# Patient Record
Sex: FEMALE
[De-identification: names, ages, dates, MRNs, and addresses within clinical notes are randomized per-mention and may not be internally consistent; named-entity substitution may affect disease eponyms.]

---

## 2022-08-03 PROBLEM — Z00.00 ENCOUNTER FOR PREVENTIVE HEALTH EXAMINATION: Status: ACTIVE | Noted: 2022-08-03

## 2022-08-30 ENCOUNTER — APPOINTMENT (OUTPATIENT)
Dept: HEPATOLOGY | Facility: CLINIC | Age: 62
End: 2022-08-30

## 2022-09-06 ENCOUNTER — TRANSCRIPTION ENCOUNTER (OUTPATIENT)
Age: 62
End: 2022-09-06

## 2022-11-03 ENCOUNTER — NON-APPOINTMENT (OUTPATIENT)
Age: 62
End: 2022-11-03

## 2022-11-03 ENCOUNTER — APPOINTMENT (OUTPATIENT)
Dept: ALLERGY | Facility: CLINIC | Age: 62
End: 2022-11-03

## 2022-11-03 VITALS
OXYGEN SATURATION: 97 % | WEIGHT: 120 LBS | SYSTOLIC BLOOD PRESSURE: 104 MMHG | TEMPERATURE: 98.3 F | DIASTOLIC BLOOD PRESSURE: 70 MMHG | BODY MASS INDEX: 22.37 KG/M2 | HEIGHT: 61.5 IN | HEART RATE: 113 BPM

## 2022-11-03 DIAGNOSIS — Z91.018 ALLERGY TO OTHER FOODS: ICD-10-CM

## 2022-11-03 DIAGNOSIS — F17.200 NICOTINE DEPENDENCE, UNSPECIFIED, UNCOMPLICATED: ICD-10-CM

## 2022-11-03 DIAGNOSIS — Z88.9 ALLERGY STATUS TO UNSPECIFIED DRUGS, MEDICAMENTS AND BIOLOGICAL SUBSTANCES: ICD-10-CM

## 2022-11-03 PROCEDURE — 99204 OFFICE O/P NEW MOD 45 MIN: CPT

## 2022-11-03 RX ORDER — KETOROLAC TROMETHAMINE 10 MG/1
10 TABLET, FILM COATED ORAL
Qty: 20 | Refills: 0 | Status: DISCONTINUED | COMMUNITY
Start: 2022-09-09

## 2022-11-03 RX ORDER — VANCOMYCIN HYDROCHLORIDE 125 MG/1
125 CAPSULE ORAL
Qty: 40 | Refills: 0 | Status: DISCONTINUED | COMMUNITY
Start: 2022-10-19

## 2022-11-03 RX ORDER — BUSPIRONE HYDROCHLORIDE 10 MG/1
10 TABLET ORAL
Qty: 60 | Refills: 0 | Status: ACTIVE | COMMUNITY
Start: 2022-07-20

## 2022-11-03 RX ORDER — PRUCALOPRIDE 2 MG/1
2 TABLET, FILM COATED ORAL
Qty: 30 | Refills: 0 | Status: DISCONTINUED | COMMUNITY
Start: 2022-04-22

## 2022-11-03 RX ORDER — VANCOMYCIN HYDROCHLORIDE 1 G/20ML
1 INJECTION, POWDER, LYOPHILIZED, FOR SOLUTION INTRAVENOUS
Qty: 1 | Refills: 0 | Status: ACTIVE | COMMUNITY
Start: 2022-11-03 | End: 1900-01-01

## 2022-11-03 RX ORDER — PANTOPRAZOLE 40 MG/1
40 TABLET, DELAYED RELEASE ORAL
Qty: 180 | Refills: 0 | Status: ACTIVE | COMMUNITY
Start: 2022-07-01

## 2022-11-03 RX ORDER — RABEPRAZOLE SODIUM 20 MG/1
20 TABLET, DELAYED RELEASE ORAL
Qty: 60 | Refills: 0 | Status: DISCONTINUED | COMMUNITY
Start: 2022-05-31

## 2022-11-03 RX ORDER — CIPROFLOXACIN HYDROCHLORIDE 500 MG/1
500 TABLET, FILM COATED ORAL
Qty: 20 | Refills: 0 | Status: DISCONTINUED | COMMUNITY
Start: 2022-09-29

## 2022-11-03 RX ORDER — METRONIDAZOLE 500 MG/1
500 TABLET ORAL
Qty: 30 | Refills: 0 | Status: DISCONTINUED | COMMUNITY
Start: 2022-09-29

## 2022-11-03 NOTE — ASSESSMENT
[FreeTextEntry1] : History of shellfish allergy:\par \par ImmunoCAP to crab, shrimp, lobster, scallop, squid, clam, mussel\par Patient instructed on the proper use of an EpiPen - precautions - follow up after use of EpiPen - need to have device available at all times\par Patient instructed to have Benadryl, in addition to EpiPen, available at all times - reviewed indications for use of Benadryl - dosing - precautions and follow up.\par \par Anxiety with taking any antibiotics based upon previous allergic reactions:\par \par Patient will RV for skin testing to Vancomycin secondary to her fear of antibiotics.

## 2022-11-03 NOTE — PHYSICAL EXAM
[Alert] : alert [Well Nourished] : well nourished [Healthy Appearance] : healthy appearance [No Acute Distress] : no acute distress [Well Developed] : well developed [Normal Voice/Communication] : normal voice communication [No Neck Mass] : no neck mass was observed [No LAD] : no lymphadenopathy [No Thyroid Mass] : no thyroid mass [Supple] : the neck was supple [Normal Rate and Effort] : normal respiratory rhythm and effort [No Crackles] : no crackles [No Retractions] : no retractions [Bilateral Audible Breath Sounds] : bilateral audible breath sounds [Normal Rate] : heart rate was normal  [Normal S1, S2] : normal S1 and S2 [No murmur] : no murmur [Regular Rhythm] : with a regular rhythm [No clubbing] : no clubbing [No Cyanosis] : no cyanosis [Normal Mood] : mood was normal [Normal Affect] : affect was normal [Judgment and Insight Age Appropriate] : judgement and insight is age appropriate [Alert, Awake, Oriented as Age-Appropriate] : alert, awake, oriented as age appropriate [Wheezing] : no wheezing was heard

## 2022-11-03 NOTE — HISTORY OF PRESENT ILLNESS
[de-identified] : Patient is being treated for GERD, HH, Valdivia esophagitis and diverticulitis and c def.   She is scheduled for Abbe fundoplication - Dr Pantoja - [place] -  GI - Dr. Strickland - [place] - she was prescribed Vancomycin for diverticulitis and c def - she has not take the antibiotic yet - she refuses to take medications because of her drug allergy history.   \par \par She has tolerated Cipro and Flagyl - both tolerated except she had to stop Cipro secondary to nausea.   \par \par Patient has PTSD secondary to allergic reactions to Ceclor - Nicorette and Chantix - first two reactions admitted to the hospital and the Chantix she was already post operative and in the hospital \par \par Adhesive - skin irritation - she tolerates the ACE bandages\par Latex gloves - skin irritation on her hands. \par \par Clams - about 10 years ago - ingested clams - hives on her neck \par Shrimp - about 8 years ago - she made a dish for her children - steam vapors - she felt swelling in her throat and coughing - she took Benadryl \par \par She has an EpiPen.  \par \par Measles vaccination - told of reaction - no details \par

## 2022-11-03 NOTE — SOCIAL HISTORY
[Apartment] : [unfilled] lives in an apartment  [Cat] : cat [] :  [FreeTextEntry1] : Lives wife \par NP PCP in home  [de-identified] : x2 [de-identified] : herself

## 2022-11-03 NOTE — REVIEW OF SYSTEMS
[Heartburn] : heartburn [Nl] : Genitourinary [FreeTextEntry9] : scoliosis with disc diease - osteoporosis Negative

## 2022-11-04 ENCOUNTER — LABORATORY RESULT (OUTPATIENT)
Age: 62
End: 2022-11-04

## 2022-11-09 LAB
BLUE MUSSEL IGE QN: <0.1 KUA/L
CLAM IGE QN: <0.1 KUA/L
CRAB IGE QN: <0.1 KUA/L
DEPRECATED BLUE MUSSEL IGE RAST QL: 0
DEPRECATED CLAM IGE RAST QL: 0
DEPRECATED CRAB IGE RAST QL: 0
DEPRECATED LOBSTER IGE RAST QL: 0
DEPRECATED OYSTER IGE RAST QL: 0
DEPRECATED SCALLOP IGE RAST QL: <0.1 KUA/L
DEPRECATED SHRIMP IGE RAST QL: 0
DEPRECATED SQUID IGE RAST QL: 0
LOBSTER IGE QN: <0.1 KUA/L
OYSTER IGE QN: <0.1 KUA/L
SCALLOP IGE QN: 0
SCALLOP IGE QN: <0.1 KUA/L
SQUID IGE QN: <0.1 KUA/L

## 2022-11-16 ENCOUNTER — APPOINTMENT (OUTPATIENT)
Dept: ALLERGY | Facility: CLINIC | Age: 62
End: 2022-11-16

## 2022-11-16 VITALS
WEIGHT: 120 LBS | OXYGEN SATURATION: 97 % | DIASTOLIC BLOOD PRESSURE: 72 MMHG | BODY MASS INDEX: 22.37 KG/M2 | HEART RATE: 95 BPM | HEIGHT: 61.5 IN | TEMPERATURE: 98.3 F | SYSTOLIC BLOOD PRESSURE: 104 MMHG

## 2022-11-16 PROCEDURE — 95018 ALL TSTG PERQ&IQ DRUGS/BIOL: CPT

## 2022-11-16 PROCEDURE — 99213 OFFICE O/P EST LOW 20 MIN: CPT | Mod: 25

## 2022-11-16 RX ORDER — CEFUROXIME AXETIL 250 MG/1
250 TABLET ORAL
Qty: 10 | Refills: 0 | Status: ACTIVE | COMMUNITY
Start: 2022-11-16 | End: 1900-01-01

## 2022-11-16 NOTE — PHYSICAL EXAM
[Alert] : alert [Well Nourished] : well nourished [Healthy Appearance] : healthy appearance [No Acute Distress] : no acute distress [Well Developed] : well developed [Normal Voice/Communication] : normal voice communication [No Neck Mass] : no neck mass was observed [No LAD] : no lymphadenopathy [No Thyroid Mass] : no thyroid mass [Supple] : the neck was supple [Normal Rate and Effort] : normal respiratory rhythm and effort [No Crackles] : no crackles [No Retractions] : no retractions [Bilateral Audible Breath Sounds] : bilateral audible breath sounds [Wheezing] : no wheezing was heard [Normal Rate] : heart rate was normal  [Normal S1, S2] : normal S1 and S2 [No murmur] : no murmur [Regular Rhythm] : with a regular rhythm [No clubbing] : no clubbing [No Cyanosis] : no cyanosis [Normal Mood] : mood was normal [Normal Affect] : affect was normal [Judgment and Insight Age Appropriate] : judgement and insight is age appropriate [Alert, Awake, Oriented as Age-Appropriate] : alert, awake, oriented as age appropriate

## 2022-11-16 NOTE — SOCIAL HISTORY
[FreeTextEntry1] : Lives wife \par NP PCP in home  [Apartment] : [unfilled] lives in an apartment  [Cat] : cat [] :  [de-identified] : x2 [de-identified] : herself

## 2022-11-16 NOTE — REVIEW OF SYSTEMS
[Heartburn] : heartburn [Nl] : Genitourinary [FreeTextEntry9] : scoliosis with disc diease - osteoporosis

## 2022-11-16 NOTE — REASON FOR VISIT
[Routine Follow-Up] : a routine follow-up visit for [FreeTextEntry3] : antibiotic skin testing - review blood work

## 2022-11-16 NOTE — ASSESSMENT
[FreeTextEntry1] : Patient with negative skin testing to PrePen, Pen G, cefuroxime, cefazolin.   Positive ID skin test to vancomycin - will discuss with Dr. Dowd.   Patient will RV for oral Ceftin challenge. \par \par No evidence of shellfish allergy.

## 2022-11-16 NOTE — HISTORY OF PRESENT ILLNESS
[de-identified] : Patient with negative skin testing and ImmunoCAP to shellfish - here to work out drug allergies

## 2022-12-07 ENCOUNTER — APPOINTMENT (OUTPATIENT)
Dept: ALLERGY | Facility: CLINIC | Age: 62
End: 2022-12-07